# Patient Record
Sex: MALE | Race: BLACK OR AFRICAN AMERICAN | NOT HISPANIC OR LATINO | Employment: FULL TIME | URBAN - METROPOLITAN AREA
[De-identification: names, ages, dates, MRNs, and addresses within clinical notes are randomized per-mention and may not be internally consistent; named-entity substitution may affect disease eponyms.]

---

## 2024-08-24 ENCOUNTER — HOSPITAL ENCOUNTER (EMERGENCY)
Facility: CLINIC | Age: 59
Discharge: HOME | End: 2024-08-24
Attending: EMERGENCY MEDICINE | Admitting: EMERGENCY MEDICINE
Payer: COMMERCIAL

## 2024-08-24 VITALS
HEART RATE: 70 BPM | TEMPERATURE: 97 F | HEIGHT: 68 IN | DIASTOLIC BLOOD PRESSURE: 94 MMHG | OXYGEN SATURATION: 100 % | RESPIRATION RATE: 18 BRPM | SYSTOLIC BLOOD PRESSURE: 156 MMHG | BODY MASS INDEX: 26.06 KG/M2 | WEIGHT: 171.96 LBS

## 2024-08-24 DIAGNOSIS — S05.02XA ABRASION OF LEFT CORNEA, INITIAL ENCOUNTER: ICD-10-CM

## 2024-08-24 PROCEDURE — 250N000009 HC RX 250: Performed by: EMERGENCY MEDICINE

## 2024-08-24 PROCEDURE — 99283 EMERGENCY DEPT VISIT LOW MDM: CPT

## 2024-08-24 RX ORDER — ERYTHROMYCIN 5 MG/G
0.5 OINTMENT OPHTHALMIC 4 TIMES DAILY
Qty: 3.5 G | Refills: 0 | Status: SHIPPED | OUTPATIENT
Start: 2024-08-24 | End: 2024-08-29

## 2024-08-24 RX ORDER — TETRACAINE HYDROCHLORIDE 5 MG/ML
2 SOLUTION OPHTHALMIC ONCE
Status: COMPLETED | OUTPATIENT
Start: 2024-08-24 | End: 2024-08-24

## 2024-08-24 RX ADMIN — FLUORESCEIN SODIUM 1 STRIP: 1 STRIP OPHTHALMIC at 14:17

## 2024-08-24 RX ADMIN — TETRACAINE HYDROCHLORIDE 2 DROP: 5 SOLUTION OPHTHALMIC at 14:17

## 2024-08-24 ASSESSMENT — COLUMBIA-SUICIDE SEVERITY RATING SCALE - C-SSRS
6. HAVE YOU EVER DONE ANYTHING, STARTED TO DO ANYTHING, OR PREPARED TO DO ANYTHING TO END YOUR LIFE?: NO
2. HAVE YOU ACTUALLY HAD ANY THOUGHTS OF KILLING YOURSELF IN THE PAST MONTH?: NO
1. IN THE PAST MONTH, HAVE YOU WISHED YOU WERE DEAD OR WISHED YOU COULD GO TO SLEEP AND NOT WAKE UP?: NO

## 2024-08-24 ASSESSMENT — ACTIVITIES OF DAILY LIVING (ADL): ADLS_ACUITY_SCORE: 33

## 2024-08-24 NOTE — ED TRIAGE NOTES
Pt arrives with c/o injury to left eye. Pt reports a laundry basket hit him in his eye. Redness and edema noted in triage. Pt denies vision issues.      Triage Assessment (Adult)       Row Name 08/24/24 2557          Triage Assessment    Airway WDL WDL        Respiratory WDL    Respiratory WDL WDL        Skin Circulation/Temperature WDL    Skin Circulation/Temperature WDL WDL        Cardiac WDL    Cardiac WDL WDL        Peripheral/Neurovascular WDL    Peripheral Neurovascular WDL WDL        Cognitive/Neuro/Behavioral WDL    Cognitive/Neuro/Behavioral WDL WDL

## 2024-08-24 NOTE — ED PROVIDER NOTES
"  Emergency Department Note      History of Present Illness     Chief Complaint   Eye Injury      HPI   Ann-Marie Atkins is a 59 year old male with a history of hyperlipidemia and macular degeneration who presents to the ED for the evaluation of a left eye injury. The patient reports that he hit his left eye on the edge of a laundry basket 2 hours ago. It is difficult to open his left eye. He has experienced constant pain. He has used ice for around 20 minutes to reduce redness. 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 He denies any new vision changes or history of glaucoma.    Independent Historian   None    Review of External Notes   none    Past Medical History     Medical History and Problem List   Hyperlipidemia  Nephrolithiasis  Macular degeneration    Medications   Atorvastatin  Bisacodyl  Omeprazole  Ondansetron  Varenicline tartrate    Surgical History   B/L inguinal hernia repair  B/L lens transplant    Physical Exam     Patient Vitals for the past 24 hrs:   BP Temp Temp src Pulse Resp SpO2 Height Weight   08/24/24 1330 (!) 156/94 97  F (36.1  C) Temporal 70 18 100 % 1.727 m (5' 8\") 78 kg (171 lb 15.3 oz)     Physical Exam    Head:  The scalp, face, and head appear normal  Eyes:    Visual acuity: Baseline per pt.   Lids WNL  No foreign body noted in detailed exam upper and lower lids/margins  PERRL, EOMI.  Anterior Chamber:No hyphema. No hypopyon.   Cornea: No foreign body. Fluorescein staining: large area of uptake over visual axis left eye.  ENT:    The nose is normal    Pinnae are normal  Neck:  Trachea midline  CV:  Normal rate  Resp:  No respiratory distress   Musc:  Normal muscular tone  Skin:  No rash or lesions noted  Neuro: Speech is normal and fluent. Face is symmetric. Moving all extremities well.             Diagnostics     Lab Results   Labs Ordered and Resulted from Time of ED Arrival to Time of ED Departure - No data " to display    Imaging   No orders to display       Independent Interpretation   None    ED Course      Medications Administered   Medications   tetracaine (PONTOCAINE) 0.5 % ophthalmic solution 2 drop (has no administration in time range)   fluorescein (FUL-GAY) ophthalmic strip 1 strip (has no administration in time range)       Procedures   Procedures     Discussion of Management   None    ED Course   ED Course as of 08/24/24 1408   Sat Aug 24, 2024   1338 I evaluated the patient and obtained history.       Additional Documentation  None    Medical Decision Making / Diagnosis     CMS Diagnoses: None    MIPS       None    MDM   Ann-Marie Atkins is a 59 year old male who presents for evaluation of eye pain. A broad differential diagnosis was considered including bacterial conjunctivitis, viral conjunctivitis, foreign body, corneal abrasion, chemical vs allergic conjunctivitis, corneal ulcer, HSV, herpes zoster opthalmicus, endopthalmitis, orbital cellulitis, etc.  Exam is consistent with a corneal abrasion.  We will treat with antibiotics.  Pain management discussed.  Patient reports he has 2 ophthalmologist given his history of macular degeneration and multiple eye surgeries.  I instructed him to reach out to 1 of these tomorrow or Monday for close follow-up given the size of his abrasion and his history of surgery.  Instructed to return for visual acuity changes, fever, orbital swelling or any worsening.  He is discharged in stable condition.  All questions answered.    Disposition   The patient was discharged.     Diagnosis     ICD-10-CM    1. Abrasion of left cornea, initial encounter  S05.02XA            Discharge Medications   New Prescriptions    ERYTHROMYCIN (ROMYCIN) 5 MG/GM OPHTHALMIC OINTMENT    Place 0.5 inches Into the left eye 4 times daily for 5 days.         Scribe Disclosure:  Re PIZARRO, am serving as a scribe at 2:05 PM on 8/24/2024 to document services personally performed by Rose Marie  Jonh Kohler MD based on my observations and the provider's statements to me.        Jonh Trotter MD  08/28/24 8215